# Patient Record
(demographics unavailable — no encounter records)

---

## 2025-01-16 NOTE — HEALTH RISK ASSESSMENT
[Very Good] : ~his/her~  mood as very good [2 - 4 times a month (2 pts)] : 2-4 times a month (2 points) [1 or 2 (0 pts)] : 1 or 2 (0 points) [Never (0 pts)] : Never (0 points) [0] : 2) Feeling down, depressed, or hopeless: Not at all (0) [PHQ-2 Negative - No further assessment needed] : PHQ-2 Negative - No further assessment needed [Former] : Former [0-4] : 0-4 [Patient reported mammogram was normal] : Patient reported mammogram was normal [Patient reported PAP Smear was normal] : Patient reported PAP Smear was normal [HIV test declined] : HIV test declined [Hepatitis C test declined] : Hepatitis C test declined [None] : None [With Family] : lives with family [Employed] : employed [] :  [Sexually Active] : sexually active [Feels Safe at Home] : Feels safe at home [Fully functional (bathing, dressing, toileting, transferring, walking, feeding)] : Fully functional (bathing, dressing, toileting, transferring, walking, feeding) [Fully functional (using the telephone, shopping, preparing meals, housekeeping, doing laundry, using] : Fully functional and needs no help or supervision to perform IADLs (using the telephone, shopping, preparing meals, housekeeping, doing laundry, using transportation, managing medications and managing finances) [Yes] : Yes [No] : In the past 12 months have you used drugs other than those required for medical reasons? 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 [Audit-CScore] : 2 [de-identified] : "not a lot"; walking  [de-identified] : balanced; but has cheese; supplements- none  [de-identified] : + mild anxiety- manageable  [[id number]] : 0 [de-identified] : socially x  10 years; last had a cigarette after drinking one year ago  [Change in mental status noted] : No change in mental status noted [Language] : denies difficulty with language [High Risk Behavior] : no high risk behavior [Reports changes in hearing] : Reports no changes in hearing [Reports changes in vision] : Reports no changes in vision [Reports changes in dental health] : Reports no changes in dental health [MammogramDate] : 12/2024  [PapSmearDate] : 08/2022  [ColonoscopyDate] : 02/2024 [ColonoscopyComments] : Dr. Arias; repeat in 5 years, 2 small polyps  [de-identified] :  and 2 kids  [FreeTextEntry2] :  [facility]  [de-identified] :  had vasectomy  [de-identified] : + glasses

## 2025-01-16 NOTE — HISTORY OF PRESENT ILLNESS
[FreeTextEntry1] : CPE [de-identified] : 49 yo f, with history of HLD, here for CPE overall is feeling well denies any other associated symptoms denies any other complaints or concerns at this time

## 2025-01-16 NOTE — ASSESSMENT
[FreeTextEntry1] : Routine medical examination VSS- exam normal  Advised healthy diet and exercise reviewed labs with patient ordered by cardio  will follow up labs  c/w current management as per cardio advised annual gyn screening with gynecology patient verbalizes understanding and patient is stable upon discharge